# Patient Record
Sex: FEMALE | Race: WHITE | Employment: OTHER | URBAN - METROPOLITAN AREA
[De-identification: names, ages, dates, MRNs, and addresses within clinical notes are randomized per-mention and may not be internally consistent; named-entity substitution may affect disease eponyms.]

---

## 2017-04-29 DIAGNOSIS — R10.9 ABDOMINAL PAIN: ICD-10-CM

## 2017-04-29 DIAGNOSIS — F98.8 ADD (ATTENTION DEFICIT DISORDER): ICD-10-CM

## 2017-04-29 DIAGNOSIS — O03.9 MISCARRIAGE: ICD-10-CM

## 2017-04-29 DIAGNOSIS — Z20.2 STD EXPOSURE: ICD-10-CM

## 2017-04-29 DIAGNOSIS — E55.9 VITAMIN D INSUFFICIENCY: ICD-10-CM

## 2017-04-29 DIAGNOSIS — Z01.419 WELL WOMAN EXAM: ICD-10-CM

## 2017-04-29 DIAGNOSIS — E78.5 HYPERLIPIDEMIA, UNSPECIFIED HYPERLIPIDEMIA TYPE: ICD-10-CM

## 2017-10-28 ENCOUNTER — HOSPITAL ENCOUNTER (EMERGENCY)
Age: 37
Discharge: HOME | End: 2017-10-28
Attending: OBSTETRICS & GYNECOLOGY | Admitting: OBSTETRICS & GYNECOLOGY
Payer: MEDICAID

## 2017-10-28 VITALS
HEIGHT: 66 IN | SYSTOLIC BLOOD PRESSURE: 111 MMHG | RESPIRATION RATE: 18 BRPM | WEIGHT: 192 LBS | DIASTOLIC BLOOD PRESSURE: 68 MMHG | BODY MASS INDEX: 30.86 KG/M2 | HEART RATE: 108 BPM | TEMPERATURE: 98.5 F

## 2017-10-28 PROBLEM — O47.9 IRREGULAR CONTRACTIONS: Status: ACTIVE | Noted: 2017-10-28

## 2017-10-28 LAB
APPEARANCE UR: ABNORMAL
BILIRUB UR QL: NEGATIVE
COLOR UR: YELLOW
GLUCOSE UR QL STRIP.AUTO: NEGATIVE MG/DL
KETONES UR-MCNC: NEGATIVE MG/DL
LEUKOCYTE ESTERASE UR QL STRIP: ABNORMAL
NITRITE UR QL: NEGATIVE
PH UR: 6 [PH] (ref 5–9)
PROT UR QL: >=8 MG/DL
RBC # UR STRIP: NEGATIVE /UL
SERVICE CMNT-IMP: ABNORMAL
SP GR UR: 1.02 (ref 1–1.02)
UROBILINOGEN UR QL: 1 EU/DL (ref 0.2–1)

## 2017-10-28 PROCEDURE — 59025 FETAL NON-STRESS TEST: CPT

## 2017-10-28 PROCEDURE — 81003 URINALYSIS AUTO W/O SCOPE: CPT

## 2017-10-28 PROCEDURE — 99283 EMERGENCY DEPT VISIT LOW MDM: CPT

## 2017-10-28 RX ORDER — ACETAMINOPHEN 500 MG
1000 TABLET ORAL
Status: ON HOLD | COMMUNITY
End: 2017-12-04

## 2017-10-28 NOTE — IP AVS SNAPSHOT
[address] Patient: Nuvia Dunham MRN: [id number] :1980 About your hospitalization You were admitted on:  N/A You last received care in the:  [facility] [id number] [place] [address] You were discharged on:  2017 Why you were hospitalized Your primary diagnosis was:  Not on File Your diagnoses also included:  Irregular Contractions Discharge Orders None A check octavio indicates which time of day the medication should be taken. My Medications ASK your physician about these medications Instructions Each Dose to Equal  
 Morning Noon Evening Bedtime ALPRAZolam 1 mg tablet Commonly known as:  Robe Andrade Your last dose was: Your next dose is: Take 1 Tab by mouth two (2) times daily as needed for Anxiety. Max Daily Amount: 2 mg.  
 1 mg PNV No12-Iron-FA-DSS-OM-3 29 mg iron-1 mg -50 mg Cpkd Your last dose was: Your next dose is: Take 1 Tab by mouth daily. 1 Tab TYLENOL EXTRA STRENGTH 500 mg tablet Generic drug:  acetaminophen Your last dose was: Your next dose is: Take 1,000 mg by mouth every six (6) hours as needed for Pain. Indications: Pain  
 1000 mg Discharge Instructions None Introducing [facility]! Jakob Lima introduces [employer] patient portal. Now you can access parts of your medical record, email your doctor's office, and request medication refills online. 1. In your internet browser, go to [email or web address] 2. Click on the First Time User? Click Here link in the Sign In box. You will see the New Member Sign Up page. 3. Enter your [employer] Access Code exactly as it appears below.  You will not need to use this code after youve completed the sign-up process. If you do not sign up before the expiration date, you must request a new code. · [facility] Access Code: [email or web address] Expires: 1/26/2018  9:50 PM 
 
4. Enter the last four digits of your Social Security Number (xxxx) and Date of Birth (mm/dd/yyyy) as indicated and click Submit. You will be taken to the next sign-up page. 5. Create a [facility] ID. This will be your [facility] login ID and cannot be changed, so think of one that is secure and easy to remember. 6. Create a [facility] password. You can change your password at any time. 7. Enter your Password Reset Question and Answer. This can be used at a later time if you forget your password. 8. Enter your e-mail address. You will receive e-mail notification when new information is available in [address] 9. Click Sign Up. You can now view and download portions of your medical record. 10. Click the Download Summary menu link to download a portable copy of your medical information. If you have questions, please visit the Frequently Asked Questions section of the [facility] website. Remember, [facility] is NOT to be used for urgent needs. For medical emergencies, dial 911. Now available from your iPhone and Android! Providers Seen During Your Hospitalization Provider Specialty Primary office phone Margret Phalen, MD Obstetrics & Gynecology [phone] Your Primary Care Physician (PCP) Primary Care Physician Office Phone Office Fax Eddie Meyer [phone] You are allergic to the following Allergen Reactions Lexapro (Escitalopram) Anxiety Recent Documentation Height Weight BMI OB Status Smoking Status 1.676 m 87.1 kg 30.99 kg/m2 Pregnant Current Every Day Smoker Emergency Contacts Name Discharge Info Relation Home Work Mobile Nguyen Cho  Parent [7] [phone] Patient Belongings The following personal items are in your possession at time of discharge: 
                             
 
  
  
Discharge Instructions Attachments/References PREGNANCY: DEPRESSION: GENERAL INFO (ENGLISH) PREGNANCY: KICK COUNTS (ENGLISH) PREGNANCY: PRECAUTIONS (ENGLISH) Patient Handouts Learning About Depression During Pregnancy Who is at risk for depression during pregnancy? Some women who are pregnant struggle with depression. It may start when you are pregnant. Or you may have had it before. If you had depression before, you are more likely to have it during your pregnancy. It may also be more likely if you feel anxious about your pregnancy or if you've had problems with a pregnancy before. No one should feel ashamed about depression. It is a disease. You are not weak. And you don't have a character flaw. When you have depression, chemicals in your brain are out of balance. These chemicals are called neurotransmitters. Managing depression is important for your own health. But it will also help you to have a healthy baby. You can treat depression with counseling, medicines, or both of these. Lifestyle changes may also help. How do you know if you are depressed? With all the changes in your life, you may not know if you are depressed. Pregnancy sometimes causes changes in how you feel that are similar to the symptoms of depression. Symptoms of depression include: · Feeling sad or hopeless and losing interest in daily activities. These are the most common symptoms of depression. · Sleeping too much or not enough. · Feeling tired. You may feel as if you have no energy. · Eating too much or too little. · Writing or talking about death, such as writing suicide notes or talking about guns, knives, or pills. Keep the numbers for these national suicide hotlines: 0-089-869-TALK (0-741.534.1647) and 6-293-AGFLZDK (3-577.478.8209).  If you or someone you know talks about suicide or feeling hopeless, get help right away. How is depression during pregnancy treated? · Counseling. This can focus on how you feel about your pregnancy, your relationships, and changes in your life. It gives you emotional support. And it can help you solve problems and set goals. One type of counseling helps you take charge of how you think and feel. This is called cognitive-behavioral therapy. · Antidepressant medicines. These medicines may improve or get rid of depression symptoms. Whether you need them depends a lot on how bad your symptoms are. Talk to your doctor about whether this medicine is right for you. You can also get regular exercise, healthy food, fresh air, and time with people who care about you. These are all important ways to prevent and treat depression and have a healthy pregnancy. Follow-up care is a key part of your treatment and safety. Be sure to make and go to all appointments, and call your doctor if you are having problems. It's also a good idea to know your test results and keep a list of the medicines you take. Where can you learn more? Go to http://kentrell-vicky.info/. Enter A240 in the search box to learn more about \"Learning About Depression During Pregnancy. \" Current as of: May 12, 2017 Content Version: 11.4 © 9198-4936 [employer]. Care instructions adapted under license by [employer] (which disclaims liability or warranty for this information). If you have questions about a medical condition or this instruction, always ask your healthcare professional. [place] [address] any warranty or liability for your use of this information. Counting Your Baby's Kicks: Care Instructions Your Care Instructions Counting your baby's kicks is one way your doctor can tell that your baby is healthy.  Most women-especially in a first pregnancy-feel their baby move for the first time between 16 and 22 weeks. The movement may feel like flutters rather than kicks. Your baby may move more at certain times of the day. When you are active, you may notice less kicking than when you are resting. At your prenatal visits, your doctor will ask whether the baby is active. In your last trimester, your doctor may ask you to count the number of times you feel your baby move. Follow-up care is a key part of your treatment and safety. Be sure to make and go to all appointments, and call your doctor if you are having problems. It's also a good idea to know your test results and keep a list of the medicines you take. How do you count fetal kicks? · A common method of checking your baby's movement is to count the number of kicks or moves you feel in 1 hour. Ten movements (such as kicks, flutters, or rolls) in 1 hour are normal. Some doctors suggest that you count in the morning until you get to 10 movements. Then you can quit for that day and start again the next day. · Pick your baby's most active time of day to count. This may be any time from morning to evening. · If you do not feel 10 movements in an hour, your baby may be sleeping. Wait for the next hour and count again. When should you call for help? Call your doctor now or seek immediate medical care if: 
? · You noticed that your baby has stopped moving or is moving much less than normal. ? Watch closely for changes in your health, and be sure to contact your doctor if you have any problems. Where can you learn more? Go to http://kentrell-vicky.info/. Enter E554 in the search box to learn more about \"Counting Your Baby's Kicks: Care Instructions. \" Current as of: March 16, 2017 Content Version: 11.4 © 6386-1243 [employer].  Care instructions adapted under license by [employer] (which disclaims liability or warranty for this information). If you have questions about a medical condition or this instruction, always ask your healthcare professional. [address] any warranty or liability for your use of this information. Pregnancy Precautions: Care Instructions Your Care Instructions There is no sure way to prevent labor before your due date ( labor) or to prevent most other pregnancy problems. But there are things you can do to increase your chances of a healthy pregnancy. Go to your appointments, follow your doctor's advice, and take good care of yourself. Eat well, and exercise (if your doctor agrees). And make sure to drink plenty of water. Follow-up care is a key part of your treatment and safety. Be sure to make and go to all appointments, and call your doctor if you are having problems. It's also a good idea to know your test results and keep a list of the medicines you take. How can you care for yourself at home? · Make sure you go to your prenatal appointments. At each visit, your doctor will check your blood pressure. Your doctor will also check to see if you have protein in your urine. High blood pressure and protein in urine are signs of preeclampsia. This condition can be dangerous for you and your baby. · Drink plenty of fluids, enough so that your urine is light yellow or clear like water. Dehydration can cause contractions. If you have kidney, heart, or liver disease and have to limit fluids, talk with your doctor before you increase the amount of fluids you drink. · Tell your doctor right away if you notice any symptoms of an infection, such as: ¨ Burning when you urinate. ¨ A foul-smelling discharge from your vagina. ¨ Vaginal itching. ¨ Unexplained fever. ¨ Unusual pain or soreness in your uterus or lower belly. · Eat a balanced diet. Include plenty of foods that are high in calcium and iron. ¨ Foods high in calcium include milk, cheese, yogurt, almonds, and broccoli. ¨ Foods high in iron include red meat, shellfish, poultry, eggs, beans, raisins, whole-grain bread, and leafy green vegetables. · Do not smoke. If you need help quitting, talk to your doctor about stop-smoking programs and medicines. These can increase your chances of quitting for good. · Do not drink alcohol or use illegal drugs. · Follow your doctor's directions about activity. Your doctor will let you know how much, if any, exercise you can do. · Ask your doctor if you can have sex. If you are at risk for early labor, your doctor may ask you to not have sex. · Take care to prevent falls. During pregnancy, your joints are loose, and your balance is off. Sports such as bicycling, skiing, or in-line skating can increase your risk of falling. And don't ride horses or motorcycles, dive, water ski, scuba dive, or parachute jump while you are pregnant. · Avoid getting very hot. Do not use saunas or hot tubs. Avoid staying out in the sun in hot weather for long periods. Take acetaminophen (Tylenol) to lower a high fever. · Do not take any over-the-counter or herbal medicines or supplements without talking to your doctor or pharmacist first. 
When should you call for help? Call 911 anytime you think you may need emergency care. For example, call if: 
? · You passed out (lost consciousness). ? · You have severe vaginal bleeding. ? · You have severe pain in your belly or pelvis. ? · You have had fluid gushing or leaking from your vagina and you know or think the umbilical cord is bulging into your vagina. If this happens, immediately get down on your knees so your rear end (buttocks) is higher than your head. This will decrease the pressure on the cord until help arrives. · ?Call your doctor now or seek immediate medical care if: 
? · You have signs of preeclampsia, such as: 
¨ Sudden swelling of your face, hands, or feet. ¨ New vision problems (such as dimness or blurring). ¨ A severe headache. ? · You have any vaginal bleeding. ? · You have belly pain or cramping. ? · You have a fever. ? · You have had regular contractions (with or without pain) for an hour. This means that you have 8 or more within 1 hour or 4 or more in 20 minutes after you change your position and drink fluids. ? · You have a sudden release of fluid from your vagina. ? · You have low back pain or pelvic pressure that does not go away. ? · You notice that your baby has stopped moving or is moving much less than normal. ? Watch closely for changes in your health, and be sure to contact your doctor if you have any problems. Where can you learn more? Go to http://kentrell-vicky.info/. Enter [id number] in the search box to learn more about \"Pregnancy Precautions: Care Instructions. \" Current as of: March 16, 2017 Content Version: 11.4 © 6154-3566 [employer]. Care instructions adapted under license by [employer] (which disclaims liability or warranty for this information). If you have questions about a medical condition or this instruction, always ask your healthcare professional. [address] any warranty or liability for your use of this information. Please provide this summary of care documentation to your next provider. Signatures-by signing, you are acknowledging that this After Visit Summary has been reviewed with you and you have received a copy. Patient Signature:  ____________________________________________________________ Date:  ____________________________________________________________  
  
Robbie Caldwell Provider Signature:  ____________________________________________________________ Date:  ____________________________________________________________

## 2017-10-28 NOTE — IP AVS SNAPSHOT
Nabeel Delcid 
 
 
 [address] [facility] [address] Patient: Valdo Casillas MRN: [id number] :1980 My Medications ASK your physician about these medications Instructions Each Dose to Equal  
 Morning Noon Evening Bedtime ALPRAZolam 1 mg tablet Commonly known as:  Karri Art Your last dose was: Your next dose is: Take 1 Tab by mouth two (2) times daily as needed for Anxiety. Max Daily Amount: 2 mg.  
 1 mg PNV No12-Iron-FA-DSS-OM-3 29 mg iron-1 mg -50 mg Cpkd Your last dose was: Your next dose is: Take 1 Tab by mouth daily. 1 Tab TYLENOL EXTRA STRENGTH 500 mg tablet Generic drug:  acetaminophen Your last dose was: Your next dose is: Take 1,000 mg by mouth every six (6) hours as needed for Pain.  Indications: Pain  
 1000 mg

## 2017-10-29 NOTE — PROGRESS NOTES
36YO   (1  delivery 36wk) @ 35d5d presents with co partial mucous plug coming out after being seen yesterday in office. 2cm at office. + fetal movement, no LOF or vaginal bleeding. Hx CS X1 , 2  ,. Uneventful pregnancy, pt states I didn't go to all my appointments. Glucose screening yesterday. No hx GDM in previous pregnancies. Reassurance provided, pt anxious, unable to relax. POC was to [facility] for high risk for AMA. Pt not happy with current OB practice. Has another appt  [facility] [place] do not [facility] now.  Called Dr Nicholas Ureña conveyed above information. Ordered to recheck cervix in an hour. Can DC home with no cervical change and reactive tracing.  recheck cervix no change. 2/long/-3 vertex. Pt voicing frustration with current OBGYN office, didn't tell to be on any bedrest @ 35w5d with dilatation. Worries/Scares her. Reassurance provided. Told pt if she feels she should not be working due her dilatation, she could stay home. Pt concerned with her place of work, just cant work. She has to feed her kid. Pt continues to talk fast, repeatedly questioning what should she do. Thorough labor precautions covered including kick counts. Due to place of residence in [place], suggested pt go to [place] that [facility] at [facility] vs continuing to see OB in [place]. Pt given phone number and address.  Pt ambulated off unit.

## 2017-10-29 NOTE — H&P
Pt is a  at 35w5d for irregular contractions. Nurse notes she is 2 cm x 2. Her prenatal course has been limited with an ob-gyn practice in [place], [place]. Please refer to prenatal record for complete information regarding prenatal course. Visit Vitals    /68 (BP 1 Location: Right arm)    Pulse (!) 108    Temp 98.5 °F (36.9 °C)    Resp 18    Ht 5' 6\" (1.676 m)    Wt 87.1 kg (192 lb)    LMP 2017    BMI 30.99 kg/m2     FHT: 150, mod v, + accels, no decels  [place]: no ctxs      A/P:  Reassuring maternal and fetal status. Irregular contractions--> No evidence of labor, continue expectant mgt   Discharge home   F/u with primary OB.

## 2017-11-13 PROBLEM — Z34.90 PREGNANT: Status: ACTIVE | Noted: 2017-11-13

## 2019-08-25 ENCOUNTER — HOSPITAL ENCOUNTER (EMERGENCY)
Age: 39
Discharge: HOME | End: 2019-08-25
Attending: EMERGENCY MEDICINE
Payer: MEDICAID

## 2019-08-25 ENCOUNTER — APPOINTMENT (OUTPATIENT)
Dept: GENERAL RADIOLOGY | Age: 39
End: 2019-08-25
Attending: EMERGENCY MEDICINE
Payer: MEDICAID

## 2019-08-25 VITALS
WEIGHT: 192 LBS | SYSTOLIC BLOOD PRESSURE: 116 MMHG | RESPIRATION RATE: 20 BRPM | DIASTOLIC BLOOD PRESSURE: 56 MMHG | BODY MASS INDEX: 30.13 KG/M2 | OXYGEN SATURATION: 100 % | HEART RATE: 85 BPM | HEIGHT: 67 IN | TEMPERATURE: 98.7 F

## 2019-08-25 DIAGNOSIS — J02.0 STREP THROAT: Primary | ICD-10-CM

## 2019-08-25 PROCEDURE — 99281 EMR DPT VST MAYX REQ PHY/QHP: CPT

## 2019-08-25 PROCEDURE — 71046 X-RAY EXAM CHEST 2 VIEWS: CPT

## 2019-08-25 RX ORDER — AMOXICILLIN 500 MG/1
500 TABLET, FILM COATED ORAL 2 TIMES DAILY
Qty: 20 TAB | Refills: 0 | Status: SHIPPED | OUTPATIENT
Start: 2019-08-25 | End: 2019-09-04

## 2019-08-26 NOTE — ED PROVIDER NOTES
EMERGENCY DEPARTMENT HISTORY AND PHYSICAL EXAM    9:27 PM      Date: 2019  Patient Name: Dolores Drummond    History of Presenting Illness     Chief Complaint   Patient presents with    Sore Throat    Cough       History Provided By: Patient    Chief Complaint: sore throat with white patches, cough, body aches  Duration: 7 Days  Timing:  Acute  Location:   Quality: Aching  Severity: 7 out of 10  Modifying Factors: none  Associated Symptoms: denies any other associated signs or symptoms      Additional History (Context):Chloe Li is a 46 y.o. female with a pertinent history of ADD, endometriosis, high cholesterol, anxiety, kidney stones, panic disorder who presents to the emergency department for evaluation of sore throat, dry cough, and body aches x1 week. Patient reports the left side of her rib cage is sore with coughing. Patient noticed white patches on her throat this morning upon awakening. She states she is not sure if it is related to the fact that her unit is infested with mice. She states she has been cleaning it all day every day. No recent fevers, chills, nausea, vomiting, possibility of pregnancy, urinary symptoms, or any other complaints. PCP:  Vaughn STEPHENS, DO        Current Outpatient Medications   Medication Sig Dispense Refill    amoxicillin 500 mg tab Take 500 mg by mouth two (2) times a day for 10 days. 20 Tab 0    ibuprofen (MOTRIN) 600 mg tablet Take 1 Tab by mouth every six (6) hours. 30 Tab 1    ALPRAZolam (XANAX) 1 mg tablet Take 1 Tab by mouth two (2) times daily as needed for Anxiety.  Max Daily Amount: 2 mg. 20 Tab 0       Past History     Past Medical History:  Past Medical History:   Diagnosis Date         ADD (attention deficit disorder)     Anemia     Depression     Endometriosis     HX OTHER MEDICAL     High Cholesterol    HX OTHER MEDICAL     Anxiety    Hypercholesterolemia     Hyperlipidemia     Kidney stones     Low back pain     Numbness and tingling of left leg     Panic disorder     Pharyngitis     Psychiatric disorder        Past Surgical History:  Past Surgical History:   Procedure Laterality Date     DELIVERY ONLY      HX  SECTION         Family History:  Family History   Problem Relation Age of Onset    Heart Disease Mother     Stroke Mother     Depression Mother     Cancer Mother         SKIN    Elevated Lipids Other     Heart Disease Other     Stroke Other        Social History:  Social History     Tobacco Use    Smoking status: Current Every Day Smoker     Packs/day: 0.25    Smokeless tobacco: Never Used   Substance Use Topics    Alcohol use: No     Comment: socially    Drug use: No       Allergies: Allergies   Allergen Reactions    Lexapro [Escitalopram] Anxiety         Review of Systems     Review of Systems   Constitutional: Negative for chills and fever. HENT: Positive for sore throat. Negative for congestion and rhinorrhea. Respiratory: Positive for cough. Negative for shortness of breath. Cardiovascular: Positive for chest pain. Gastrointestinal: Negative for abdominal pain, blood in stool, constipation, diarrhea, nausea and vomiting. Genitourinary: Negative for dysuria, frequency and hematuria. Musculoskeletal: Positive for myalgias. Negative for back pain. Skin: Negative for rash and wound. Neurological: Negative for dizziness and headaches. All other systems reviewed and are negative. Physical Exam     Visit Vitals  /56 (BP 1 Location: Left arm, BP Patient Position: At rest)   Pulse 85   Temp 98.7 °F (37.1 °C)   Resp 20   Ht 5' 7\" (1.702 m)   Wt 87.1 kg (192 lb)   SpO2 100%   BMI 30.07 kg/m²       Physical Exam   Constitutional: She is oriented to person, place, and time. She appears well-developed and well-nourished. No distress. HENT:   Head: Normocephalic and atraumatic.    Erythematous posterior oropharynx with white patches noted to left tonsillar crypts. Bilateral nasal turbinates are pale and edematous. Eyes: Conjunctivae are normal.   Neck: Normal range of motion. Neck supple. Cardiovascular: Normal rate, regular rhythm and normal heart sounds. Pulmonary/Chest: Effort normal and breath sounds normal. No respiratory distress. She exhibits no tenderness. Lungs clear to auscultation bilaterally   Musculoskeletal: She exhibits no edema or deformity. Neurological: She is alert and oriented to person, place, and time. She has normal reflexes. Skin: Skin is warm and dry. She is not diaphoretic. Psychiatric: She has a normal mood and affect. Nursing note and vitals reviewed. Diagnostic Study Results     Labs -  No results found for this or any previous visit (from the past 12 hour(s)). Radiologic Studies -   No results found. Medical Decision Making   I am the first provider for this patient. I reviewed the vital signs, available nursing notes, past medical history, past surgical history, family history and social history. Vital Signs-Reviewed the patient's vital signs. Pulse Oximetry Analysis -  100% on room air (Interpretation)    Records Reviewed: Nursing Notes and Old Medical Records (Time of Review: 9:27 PM)    ED Course: Progress Notes, Reevaluation, and Consults:    Provider Notes (Medical Decision Making):   differential diagnosis:  URI, allergic rhinitis, strep, PNA    Plan:  Pt presents ambulatory in NAD, vitals wnl. Exam and HPI consistent with strep. CXR negative, pending rad read. Will DC home with amoxicillin. At this time, patient is stable and appropriate for discharge home. Patient demonstrates understanding of current diagnoses and is in agreement with the treatment plan. They are advised that while the likelihood of serious underlying condition is low at this point given the evaluation performed today, we cannot fully rule it out.   They are advised to immediately return with any new symptoms or worsening of current condition. All questions have been answered. Patient is given educational material regarding their diagnoses, including danger symptoms and when to return to the ED. Diagnosis     Clinical Impression:   1. Strep throat        Disposition: DC Home    Follow-up Information     Follow up With Specialties Details Why Contact Info    Carla Tapia, DO Internal Medicine Call in 2 days  [address]  [phone] [address] [place] EMERGENCY DEPT Emergency Medicine Go to As needed [address] [facility]  [phone]           Patient's Medications   Start Taking    AMOXICILLIN 500 MG TAB    Take 500 mg by mouth two (2) times a day for 10 days. Continue Taking    ALPRAZOLAM (XANAX) 1 MG TABLET    Take 1 Tab by mouth two (2) times daily as needed for Anxiety. Max Daily Amount: 2 mg. IBUPROFEN (MOTRIN) 600 MG TABLET    Take 1 Tab by mouth every six (6) hours. These Medications have changed    No medications on file   Stop Taking    OXYCODONE IR (ROXICODONE) 5 MG IMMEDIATE RELEASE TABLET    Take 1-2 Tabs by mouth every four (4) hours as needed. Max Daily Amount: 60 mg. OXYCODONE-ACETAMINOPHEN (PERCOCET) 5-325 MG PER TABLET    Take 1-2 Tabs by mouth every four (4) hours as needed for Pain. Max Daily Amount: 12 Tabs. Indications: Pain    PNV [id number] 29 MG IRON-1 MG -50 MG CPKD    Take 1 Tab by mouth daily. SERTRALINE (ZOLOFT) 50 MG TABLET    Take 1 Tab by mouth daily. _______________________________    This note was dictated utilizing voice recognition software which may lead to typographical errors. I apologize in advance if the situation occurs. If questions arise please do not hesitate to contact me or call our department.   Liiva Shelton PA-C

## 2019-08-26 NOTE — DISCHARGE INSTRUCTIONS
Patient Education     Please return immediately to the Emergency Room for re-evaluation if you are not improving, develop any new symptoms, or develop worsening of current symptoms! If you have been prescribed a medication and are unable to take this medication for any reason, please return to the Emergency Department for further evaluation! If you have been referred for follow-up to a specialist, but are unable to follow-up and your symptoms are either not improving or are worsening, please return to the Emergency Department for further evaluation! Strep Throat: Care Instructions  Your Care Instructions    Strep throat is a bacterial infection that causes sudden, severe sore throat and fever. Strep throat, which is caused by bacteria called streptococcus, is treated with antibiotics. Sometimes a strep test is necessary to tell if the sore throat is caused by strep bacteria. Treatment can help ease symptoms and may prevent future problems. Follow-up care is a key part of your treatment and safety. Be sure to make and go to all appointments, and call your doctor if you are having problems. It's also a good idea to know your test results and keep a list of the medicines you take. How can you care for yourself at home? · Take your antibiotics as directed. Do not stop taking them just because you feel better. You need to take the full course of antibiotics. · Strep throat can spread to others until 24 hours after you begin taking antibiotics. During this time, you should avoid contact with other people at work or home, especially infants and children. Do not sneeze or cough on others, and wash your hands often. Keep your drinking glass and eating utensils separate from those of others, and wash these items well in hot, soapy water. · Gargle with warm salt water at least once each hour to help reduce swelling and make your throat feel better.  Use 1 teaspoon of salt mixed in 8 fluid ounces of warm water.  · Take an over-the-counter pain medication, such as acetaminophen (Tylenol), ibuprofen (Advil, Motrin), or naproxen (Aleve). Read and follow all instructions on the label. · Try an over-the-counter anesthetic throat spray or throat lozenges, which may help relieve throat pain. · Drink plenty of fluids. Fluids may help soothe an irritated throat. Hot fluids, such as tea or soup, may help your throat feel better. · Eat soft solids and drink plenty of clear liquids. Flavored ice pops, ice cream, scrambled eggs, sherbet, and gelatin dessert (such as Jell-O) may also soothe the throat. · Get lots of rest.  · Do not smoke, and avoid secondhand smoke. If you need help quitting, talk to your doctor about stop-smoking programs and medicines. These can increase your chances of quitting for good. · Use a vaporizer or humidifier to add moisture to the air in your bedroom. Follow the directions for cleaning the machine. When should you call for help? Call your doctor now or seek immediate medical care if:    · You have a new or higher fever.     · You have a fever with a stiff neck or severe headache.     · You have new or worse trouble swallowing.     · Your sore throat gets much worse on one side.     · Your pain becomes much worse on one side of your throat.    Watch closely for changes in your health, and be sure to contact your doctor if:    · You are not getting better after 2 days (48 hours).     · You do not get better as expected. Where can you learn more? Go to http://kentrell-vicky.info/. Enter K625 in the search box to learn more about \"Strep Throat: Care Instructions. \"  Current as of: October 21, 2018  Content Version: 12.1  © 4797-7449 [employer]. Care instructions adapted under license by [employer] (which disclaims liability or warranty for this information).  If you have questions about a medical condition or this instruction, always ask your healthcare professional. [address] any warranty or liability for your use of this information.

## 2019-08-26 NOTE — ED NOTES
Pt discharged to home. Instructed to take antibiotic as prescribed, to follow up with a PCP, and to return for worsening pain/swelling/difficulty swallowing. Oral hygiene encouraged.

## 2019-08-26 NOTE — ED TRIAGE NOTES
Pt. Complains of sore throat, cough, rib pain, congestion over the past week after cleaning out a storage unit infested with rodents.